# Patient Record
Sex: FEMALE | Race: OTHER | Employment: UNEMPLOYED | URBAN - METROPOLITAN AREA
[De-identification: names, ages, dates, MRNs, and addresses within clinical notes are randomized per-mention and may not be internally consistent; named-entity substitution may affect disease eponyms.]

---

## 2017-09-18 ENCOUNTER — APPOINTMENT (OUTPATIENT)
Dept: ULTRASOUND IMAGING | Age: 22
End: 2017-09-18
Attending: PHYSICIAN ASSISTANT
Payer: MEDICAID

## 2017-09-18 ENCOUNTER — HOSPITAL ENCOUNTER (EMERGENCY)
Age: 22
Discharge: HOME | End: 2017-09-18
Attending: EMERGENCY MEDICINE
Payer: MEDICAID

## 2017-09-18 VITALS
TEMPERATURE: 98.2 F | DIASTOLIC BLOOD PRESSURE: 59 MMHG | HEART RATE: 64 BPM | WEIGHT: 166 LBS | OXYGEN SATURATION: 100 % | HEIGHT: 66 IN | BODY MASS INDEX: 26.68 KG/M2 | SYSTOLIC BLOOD PRESSURE: 109 MMHG | RESPIRATION RATE: 16 BRPM

## 2017-09-18 DIAGNOSIS — Z3A.19 19 WEEKS GESTATION OF PREGNANCY: Primary | ICD-10-CM

## 2017-09-18 LAB
ABO + RH BLD: NORMAL
ANION GAP SERPL CALC-SCNC: 7 MMOL/L (ref 3–18)
APPEARANCE UR: CLEAR
BASOPHILS # BLD: 0.1 K/UL (ref 0–0.06)
BASOPHILS NFR BLD: 0 % (ref 0–2)
BILIRUB UR QL: NEGATIVE
BUN SERPL-MCNC: 9 MG/DL (ref 7–18)
BUN/CREAT SERPL: 16 (ref 12–20)
CALCIUM SERPL-MCNC: 9.3 MG/DL (ref 8.5–10.1)
CHLORIDE SERPL-SCNC: 104 MMOL/L (ref 100–108)
CO2 SERPL-SCNC: 26 MMOL/L (ref 21–32)
COLOR UR: YELLOW
CREAT SERPL-MCNC: 0.55 MG/DL (ref 0.6–1.3)
DIFFERENTIAL METHOD BLD: ABNORMAL
EOSINOPHIL # BLD: 0.1 K/UL (ref 0–0.4)
EOSINOPHIL NFR BLD: 0 % (ref 0–5)
ERYTHROCYTE [DISTWIDTH] IN BLOOD BY AUTOMATED COUNT: 12.9 % (ref 11.6–14.5)
GLUCOSE SERPL-MCNC: 85 MG/DL (ref 74–99)
GLUCOSE UR STRIP.AUTO-MCNC: NEGATIVE MG/DL
HCG SERPL-ACNC: ABNORMAL MIU/ML (ref 1–6)
HCG UR QL: POSITIVE
HCT VFR BLD AUTO: 33.9 % (ref 35–45)
HGB BLD-MCNC: 11.6 G/DL (ref 12–16)
HGB UR QL STRIP: NEGATIVE
KETONES UR QL STRIP.AUTO: NEGATIVE MG/DL
LEUKOCYTE ESTERASE UR QL STRIP.AUTO: NEGATIVE
LYMPHOCYTES # BLD: 4.2 K/UL (ref 0.9–3.6)
LYMPHOCYTES NFR BLD: 29 % (ref 21–52)
MCH RBC QN AUTO: 30.2 PG (ref 24–34)
MCHC RBC AUTO-ENTMCNC: 34.2 G/DL (ref 31–37)
MCV RBC AUTO: 88.3 FL (ref 74–97)
MONOCYTES # BLD: 0.7 K/UL (ref 0.05–1.2)
MONOCYTES NFR BLD: 5 % (ref 3–10)
NEUTS SEG # BLD: 9.3 K/UL (ref 1.8–8)
NEUTS SEG NFR BLD: 66 % (ref 40–73)
NITRITE UR QL STRIP.AUTO: NEGATIVE
PH UR STRIP: 6 [PH] (ref 5–8)
PLATELET # BLD AUTO: 316 K/UL (ref 135–420)
PMV BLD AUTO: 8.9 FL (ref 9.2–11.8)
POTASSIUM SERPL-SCNC: 4 MMOL/L (ref 3.5–5.5)
PROT UR STRIP-MCNC: NEGATIVE MG/DL
RBC # BLD AUTO: 3.84 M/UL (ref 4.2–5.3)
SERVICE CMNT-IMP: NORMAL
SODIUM SERPL-SCNC: 137 MMOL/L (ref 136–145)
SP GR UR REFRACTOMETRY: 1.02 (ref 1–1.03)
UROBILINOGEN UR QL STRIP.AUTO: 1 EU/DL (ref 0.2–1)
WBC # BLD AUTO: 14.2 K/UL (ref 4.6–13.2)
WET PREP GENITAL: NORMAL
WET PREP GENITAL: NORMAL

## 2017-09-18 PROCEDURE — 84702 CHORIONIC GONADOTROPIN TEST: CPT | Performed by: PHYSICIAN ASSISTANT

## 2017-09-18 PROCEDURE — 85025 COMPLETE CBC W/AUTO DIFF WBC: CPT | Performed by: PHYSICIAN ASSISTANT

## 2017-09-18 PROCEDURE — 87491 CHLMYD TRACH DNA AMP PROBE: CPT | Performed by: PHYSICIAN ASSISTANT

## 2017-09-18 PROCEDURE — 80048 BASIC METABOLIC PNL TOTAL CA: CPT | Performed by: PHYSICIAN ASSISTANT

## 2017-09-18 PROCEDURE — 81003 URINALYSIS AUTO W/O SCOPE: CPT | Performed by: EMERGENCY MEDICINE

## 2017-09-18 PROCEDURE — 76805 OB US >/= 14 WKS SNGL FETUS: CPT

## 2017-09-18 PROCEDURE — 86900 BLOOD TYPING SEROLOGIC ABO: CPT | Performed by: PHYSICIAN ASSISTANT

## 2017-09-18 PROCEDURE — 99285 EMERGENCY DEPT VISIT HI MDM: CPT

## 2017-09-18 PROCEDURE — 81025 URINE PREGNANCY TEST: CPT

## 2017-09-18 PROCEDURE — 87210 SMEAR WET MOUNT SALINE/INK: CPT | Performed by: PHYSICIAN ASSISTANT

## 2017-09-18 NOTE — DISCHARGE INSTRUCTIONS
Semanas 18 a 22 de bettencourt embarazo: Instrucciones de cuidado - [ [place] 18 to 22 of Your Pregnancy: Care Instructions ]  Instrucciones de cuidado    Bettencourt bebé continúa desarrollándose rápidamente. En esta etapa, los bebés ya pueden chuparse el pulgar, agarrar firmemente con las [place], y abrir y cerrar los párpados. En algún Ramirez's 18 y 25, comenzará a sentir que el bebé se [place]. Al principio, estos pequeños movimientos se sentirán thanh un aleteo o un vuelo de mariposas. Algunas mujeres dicen que sienten thanh burbujas de [place]. A medida que el bebé crece, estos movimientos serán más gary. También podría observar que bettencourt bebé patea y tiene hipo. Bael javy Viki, podría descubrir que las náuseas y la fatiga desaparecieron. En general, es posible que se sienta mejor y tenga más energía que la que tenía abel el primer trimestre. Sin embargo, también podría tener nuevas [place]OVER, thanh problemas para dormir o calambres en las piernas. Esta hoja de cuidados la ayudará a aliviar esas molestias. La atención de seguimiento es maite parte clave de bettencourt tratamiento y seguridad. Asegúrese de hacer y acudir a todas las citas, y llame a bettencourt médico si está teniendo problemas. También es maite buena idea saber los resultados de los exámenes y mantener maite lista de los medicamentos que clarence. ¿Cómo puede cuidarse en el hogar? Alivie los problemas para dormir  · Evite la cafeína en las bebidas o los chocolates a última hora del día. · Ron algo de ejercicio todos los días. · Dúchese o báñese en agua tibia antes de irse a la cama. · Coma un refrigerio liviano o marisol un vaso de leche a la hora de dormir. · Ron ejercicios de relajación en la cama para tranquilizar bettencourt mente y bettencourt cuerpo. · Apoye shivam piernas y bettencourt espalda con almohadas adicionales. Si duerme de costado, pruebe a ponerse maite [employer] shivam piernas. · No use píldoras para dormir ni consuma alcohol. Podrían hacerle daño a bettencourt bebé.   [employer] calambres en las piernas  · No masajee bettnecourt pantorrilla mientras tiene un calambre. · Siéntese en maite cama o silla firme. Estire la hedy y [employer] ([facility]) despacio, [place] isidro, en dirección a la rodilla. Doble los dedos de los pies hacia arriba y [place]. · Póngase de pie sobre maite superficie plana y fresca. Estire los dedos de los pies hacia arriba y dé pequeños pasos con el talón. · Use maite almohadilla térmica o maite bolsa de [place] para aliviar greta musculares. Prevenga los calambres en las piernas  · Asegúrese de consumir suficiente calcio. Si está preocupada porque no está obteniendo lo suficiente, hable con bettencourt médico.  · Ron ejercicio todos los jayant y estire las piernas antes de irse a dormir. · Dese un baño tibio antes de irse a dormir y pruebe a usar calentadores de piernas. ¿Dónde puede encontrar más información en inglés? Dominique Clement a http://den-rad.info/. Mikki Mace U262 en la búsqueda para aprender más acerca de \"Semanas 18 a 22 de bettencourt embarazo: Instrucciones de cuidado - [ [place] 18 to 22 of Your Pregnancy: Care Instructions ]. \"  Revisado: 16 marzo, 2017  Versión del contenido: 11.3  © 6223-8180 [employer]. Las instrucciones de cuidado fueron adaptadas bajo licencia por [employer] (which disclaims liability or warranty for this information). Si usted tiene [place] afección médica o sobre estas instrucciones, siempre pregunte a bettencourt profesional de amos. Healthwise, Incorporated niega toda garantía o responsabilidad por bettencourt uso de esta información.

## 2017-09-18 NOTE — ED TRIAGE NOTES
C/o abd pain, nausea, spotting, states she may be preg. Sepsis Screening completed    (  )Patient meets SIRS criteria. ( x )Patient does not meet SIRS criteria.       SIRS Criteria is achieved when two or more of the following are present   Temperature < 96.8°F (36°C) or > 100.9°F (38.3°C)   Heart Rate > 90 beats per minute   Respiratory Rate > 20 breaths per minute   WBC count > 12,000 or <4,000 or > 10% bands

## 2017-09-18 NOTE — ED PROVIDER NOTES
[address]  EMERGENCY DEPARTMENT HISTORY AND PHYSICAL EXAM       Date: 9/18/2017   Patient Name: Amira Freitas   YOB: 1995  Medical Record Number: [id number]    History of Presenting Illness     Chief Complaint   Patient presents with    Missed Menses        History Provided By:  patient    Additional History: 2:18 PM   Amira Freitas is a 25 y.o. female who presents to the emergency department C/O nausea, vomiting, and mild intermittent suprapubic cramping, starting 2 months ago. Associated sxs include mild vaginal bleeding described as spotting 4 weeks ago, which resolved. Pt reports she has a very irregular menstrual cycle; last menses was 5 months ago which is not unusual for her. Reports hx of 8 months without a menstrual cycle in the past. Pt voices concern for possible pregnancy. Denies new sexual partners or concern for STDs. Pt has not taken an at-home pregnancy test. No previous pregnancy. Denies dysuria, increased urinary frequency, and any other sxs or complaints. Primary Care Provider: None   Specialist:    Past History     Past Medical History:   No past medical history on file. Past Surgical History:   No past surgical history on file. Family History:   No family history on file. Social History:   Social History   Substance Use Topics    Smoking status: Not on file    Smokeless tobacco: Not on file    Alcohol use Not on file        Allergies:   No Known Allergies     Review of Systems   Review of Systems   Gastrointestinal: Positive for abdominal distention (suprapubic), abdominal pain (suprapubic abdominal cramping), nausea and vomiting. Genitourinary: Positive for menstrual problem (chronic) and vaginal bleeding (mild). Negative for dysuria and frequency. All other systems reviewed and are negative.       Physical Exam  Vitals:    09/18/17 1355 09/18/17 1642   BP: 126/61 109/59   Pulse: 72 64   Resp: 14 16   Temp: 98.2 °F (36.8 °C) SpO2: 100% 100%   Weight: 75.3 kg (166 lb)    Height: 5' 6\" (1.676 m)        Physical Exam   Nursing note and vitals reviewed. Vital signs and nursing notes reviewed. CONSTITUTIONAL: Alert. Well-appearing; well-nourished; in no apparent distress. CV: Normal S1, S2; no murmurs, rubs, or gallops. No chest wall tenderness. RESPIRATORY: Normal chest excursion with respiration; breath sounds clear and equal bilaterally; no wheezes, rhonchi, or rales. GI: Normal bowel sounds; non-distended; gravid uterus 2 finger-widths below the umbilicus; nontender, no guarding or rigidity; no palpable organomegaly. No CVA tenderness. PELVIC: Normal external genitalia, without rash or lesions. Speculum exam: Normal appearing cervix. No discharge noted. Vaginal walls without lesion. Bimanual exam: No CMT, cervical os closed. Gravid uterus, nontender. No adnexal tenderness or mass palpated. BACK:  No evidence of trauma or deformity. Non-tender to palpation. FROM without difficulty. Negative straight leg raise bilaterally. EXT: Normal ROM in all four extremities; non-tender to palpation. SKIN: Normal for age and race; warm; dry; good turgor; no apparent lesions or exudate. NEURO: A & O x3. PSYCH:  Mood and affect appropriate.        Diagnostic Study Results     Labs -      Recent Results (from the past 12 hour(s))   URINALYSIS W/ RFLX MICROSCOPIC    Collection Time: 09/18/17  2:00 PM   Result Value Ref Range    Color YELLOW      Appearance CLEAR      Specific gravity 1.024 1.005 - 1.030      pH (UA) 6.0 5.0 - 8.0      Protein NEGATIVE  NEG mg/dL    Glucose NEGATIVE  NEG mg/dL    Ketone NEGATIVE  NEG mg/dL    Bilirubin NEGATIVE  NEG      Blood NEGATIVE  NEG      Urobilinogen 1.0 0.2 - 1.0 EU/dL    Nitrites NEGATIVE  NEG      Leukocyte Esterase NEGATIVE  NEG     HCG URINE, QL. - POC    Collection Time: 09/18/17  2:19 PM   Result Value Ref Range    Pregnancy test,urine (POC) POSITIVE (A) NEG     TYPE, ABO & RH Collection Time: 09/18/17  2:30 PM   Result Value Ref Range    ABO/Rh(D) O POSITIVE    WET PREP    Collection Time: 09/18/17  2:48 PM   Result Value Ref Range    Special Requests: NO SPECIAL REQUESTS      Wet prep NO YEAST,TRICHOMONAS OR CLUE CELLS NOTED      Wet prep NO FUNGAL ELEMENTS SEEN     CBC WITH AUTOMATED DIFF    Collection Time: 09/18/17  3:55 PM   Result Value Ref Range    WBC 14.2 (H) 4.6 - 13.2 K/uL    RBC 3.84 (L) 4.20 - 5.30 M/uL    HGB 11.6 (L) 12.0 - 16.0 g/dL    HCT 33.9 (L) 35.0 - 45.0 %    MCV 88.3 74.0 - 97.0 FL    MCH 30.2 24.0 - 34.0 PG    MCHC 34.2 31.0 - 37.0 g/dL    RDW 12.9 11.6 - 14.5 %    PLATELET 423 838 - 617 K/uL    MPV 8.9 (L) 9.2 - 11.8 FL    NEUTROPHILS 66 40 - 73 %    LYMPHOCYTES 29 21 - 52 %    MONOCYTES 5 3 - 10 %    EOSINOPHILS 0 0 - 5 %    BASOPHILS 0 0 - 2 %    ABS. NEUTROPHILS 9.3 (H) 1.8 - 8.0 K/UL    ABS. LYMPHOCYTES 4.2 (H) 0.9 - 3.6 K/UL    ABS. MONOCYTES 0.7 0.05 - 1.2 K/UL    ABS. EOSINOPHILS 0.1 0.0 - 0.4 K/UL    ABS. BASOPHILS 0.1 (H) 0.0 - 0.06 K/UL    DF AUTOMATED     METABOLIC PANEL, BASIC    Collection Time: 09/18/17  3:55 PM   Result Value Ref Range    Sodium 137 136 - 145 mmol/L    Potassium 4.0 3.5 - 5.5 mmol/L    Chloride 104 100 - 108 mmol/L    CO2 26 21 - 32 mmol/L    Anion gap 7 3.0 - 18 mmol/L    Glucose 85 74 - 99 mg/dL    BUN 9 7.0 - 18 MG/DL    Creatinine 0.55 (L) 0.6 - 1.3 MG/DL    BUN/Creatinine ratio 16 12 - 20      GFR est AA >60 >60 ml/min/1.73m2    GFR est non-AA >60 >60 ml/min/1.73m2    Calcium 9.3 8.5 - 10.1 MG/DL   BETA HCG, QT    Collection Time: 09/18/17  3:55 PM   Result Value [place] [address] (H) 1.0 - 6.0 MIU/ML       Radiologic Studies -  The following have been ordered and reviewed:  US PREG UTS > 14 WKS SNGL   Final Result   IMPRESSION:  1. Single live intrauterine pregnancy.   Estimated gestational age based on  current sonogram is 19 weeks 6 days.       2.  Normal sonographic appearance to the left ovary, with nonvisualization of  the right ovary. As read by the radiologist.        Medical Decision Making   I am the first provider for this patient. I reviewed the vital signs, available nursing notes, past medical history, past surgical history, family history and social history. Vital Signs-Reviewed the patient's vital signs. Patient Vitals for the past 12 hrs:   Temp Pulse Resp BP SpO2   09/18/17 1642 - 64 16 109/59 100 %   09/18/17 1355 98.2 °F (36.8 °C) 72 14 126/61 100 %       Pulse Oximetry Analysis - Normal 100% on room air     Procedures:   Procedures    PROCEDURE NOTE - PELVIC EXAM:    2:49 PM  Performed by: [employer]  Pelvic exam was performed using bimanual and speculum. Further findings noted in physical exam. Fetal heart tones of 142 bpm. Chlamydia/GC, wet prep collected and sent to lab. Procedure chaperoned by nursing staff. The procedure took 1-15 minutes, and pt tolerated well. Written by Sonam Dalal ED Scribe, as dictated by [employer].       ED Course:  2:18 PM  Initial assessment performed. The patients presenting problems have been discussed, and they are in agreement with the care plan formulated and outlined with them. I have encouraged them to ask questions as they arise throughout their visit. Medications Given in the ED:  Medications - No data to display    Discharge Note:  4:45 PM   Pt has been reexamined. Patient has no new complaints, changes, or physical findings. Care plan outlined and precautions discussed. Results were reviewed with the patient. All of pt's questions and concerns were addressed. Patient was instructed and agrees to follow up with OB, as well as to return to the ED upon further deterioration. Patient is ready to go home.     Diagnosis   Clinical Impression:   1. 19 weeks gestation of pregnancy         Follow-up Information     Follow up With Details Comments Angelica Wisdom MD Schedule an appointment as soon as possible for a visit in 2 days For OB follow up [address] [facility]  [phone]      [facility] EMERGENCY DE[facility]  As needed, If symptoms worsen [address] [id number]  [phone]          There are no discharge medications for this patient.      _______________________________   Attestations: This note is prepared by Julien Ibrahim, acting as a Scribe for Stephanie Chun PA-C on 2:17 PM on 9/18/2017 . Stephanie Chun PA-C: The scribe's documentation has been prepared under my direction and personally reviewed by me in its entirety.   _______________________________

## 2017-09-18 NOTE — ED NOTES
Pt ambulates into ER seeking a pregnancy test due to missed menses. Last normal menstrual cycle was in April per pt. Pt reports she has spotted twice since her last normal menstrual cycle in April. Pt also reports N/V x 1.5 months and bilat LQ abd pain and a productive cough for the last week.

## 2017-09-18 NOTE — ED NOTES
Pt lying quietly in bed watching TV, expresses no needs at this time.   Bed in lowest locked position, side rails up, call bell within reach

## 2017-09-19 LAB
C TRACH RRNA SPEC QL NAA+PROBE: NEGATIVE
N GONORRHOEA RRNA SPEC QL NAA+PROBE: NEGATIVE
SPECIMEN SOURCE: NORMAL